# Patient Record
Sex: MALE | Race: WHITE | NOT HISPANIC OR LATINO | URBAN - METROPOLITAN AREA
[De-identification: names, ages, dates, MRNs, and addresses within clinical notes are randomized per-mention and may not be internally consistent; named-entity substitution may affect disease eponyms.]

---

## 2017-12-24 ENCOUNTER — EMERGENCY (EMERGENCY)
Facility: HOSPITAL | Age: 82
LOS: 1 days | Discharge: HOME | End: 2017-12-24
Attending: EMERGENCY MEDICINE | Admitting: EMERGENCY MEDICINE
Payer: MEDICARE

## 2017-12-24 VITALS
RESPIRATION RATE: 18 BRPM | HEART RATE: 58 BPM | OXYGEN SATURATION: 96 % | SYSTOLIC BLOOD PRESSURE: 132 MMHG | DIASTOLIC BLOOD PRESSURE: 59 MMHG

## 2017-12-24 VITALS
SYSTOLIC BLOOD PRESSURE: 238 MMHG | DIASTOLIC BLOOD PRESSURE: 91 MMHG | TEMPERATURE: 98 F | OXYGEN SATURATION: 97 % | HEART RATE: 61 BPM | RESPIRATION RATE: 18 BRPM

## 2017-12-24 DIAGNOSIS — S01.111A LACERATION WITHOUT FOREIGN BODY OF RIGHT EYELID AND PERIOCULAR AREA, INITIAL ENCOUNTER: ICD-10-CM
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
## 2017-12-24 DIAGNOSIS — I10 ESSENTIAL (PRIMARY) HYPERTENSION: ICD-10-CM
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
## 2017-12-24 DIAGNOSIS — E78.5 HYPERLIPIDEMIA, UNSPECIFIED: ICD-10-CM

## 2017-12-24 DIAGNOSIS — I25.10 ATHEROSCLEROTIC HEART DISEASE OF NATIVE CORONARY ARTERY WITHOUT ANGINA PECTORIS: ICD-10-CM
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
## 2017-12-24 LAB
ALBUMIN SERPL ELPH-MCNC: 4.1 G/DL — SIGNIFICANT CHANGE UP (ref 3.3–5)
ALP SERPL-CCNC: 55 U/L — SIGNIFICANT CHANGE UP (ref 40–120)
ALT FLD-CCNC: 13 U/L — SIGNIFICANT CHANGE UP (ref 10–45)
ANION GAP SERPL CALC-SCNC: 9 MMOL/L — SIGNIFICANT CHANGE UP (ref 5–17)
APPEARANCE UR: CLEAR — SIGNIFICANT CHANGE UP
APTT BLD: 28.4 SEC — SIGNIFICANT CHANGE UP (ref 27.5–37.4)
AST SERPL-CCNC: 21 U/L — SIGNIFICANT CHANGE UP (ref 10–40)
BACTERIA # UR AUTO: PRESENT /HPF
BASOPHILS NFR BLD AUTO: 0.4 % — SIGNIFICANT CHANGE UP (ref 0–2)
BILIRUB SERPL-MCNC: 0.6 MG/DL — SIGNIFICANT CHANGE UP (ref 0.2–1.2)
BILIRUB UR-MCNC: NEGATIVE — SIGNIFICANT CHANGE UP
BUN SERPL-MCNC: 21 MG/DL — SIGNIFICANT CHANGE UP (ref 7–23)
CALCIUM SERPL-MCNC: 9.6 MG/DL — SIGNIFICANT CHANGE UP (ref 8.4–10.5)
CHLORIDE SERPL-SCNC: 102 MMOL/L — SIGNIFICANT CHANGE UP (ref 96–108)
CO2 SERPL-SCNC: 30 MMOL/L — SIGNIFICANT CHANGE UP (ref 22–31)
COLOR SPEC: YELLOW — SIGNIFICANT CHANGE UP
CREAT SERPL-MCNC: 1.03 MG/DL — SIGNIFICANT CHANGE UP (ref 0.5–1.3)
DIFF PNL FLD: (no result)
EOSINOPHIL NFR BLD AUTO: 2.7 % — SIGNIFICANT CHANGE UP (ref 0–6)
EPI CELLS # UR: SIGNIFICANT CHANGE UP /HPF (ref 0–5)
GLUCOSE SERPL-MCNC: 140 MG/DL — HIGH (ref 70–99)
GLUCOSE UR QL: 100
HCT VFR BLD CALC: 36.2 % — LOW (ref 39–50)
HGB BLD-MCNC: 12.9 G/DL — LOW (ref 13–17)
INR BLD: 1.19 — HIGH (ref 0.88–1.16)
KETONES UR-MCNC: 15 MG/DL
LEUKOCYTE ESTERASE UR-ACNC: NEGATIVE — SIGNIFICANT CHANGE UP
LYMPHOCYTES # BLD AUTO: 26.5 % — SIGNIFICANT CHANGE UP (ref 13–44)
MCHC RBC-ENTMCNC: 32.1 PG — SIGNIFICANT CHANGE UP (ref 27–34)
MCHC RBC-ENTMCNC: 35.6 G/DL — SIGNIFICANT CHANGE UP (ref 32–36)
MCV RBC AUTO: 90 FL — SIGNIFICANT CHANGE UP (ref 80–100)
MONOCYTES NFR BLD AUTO: 12.8 % — SIGNIFICANT CHANGE UP (ref 2–14)
NEUTROPHILS NFR BLD AUTO: 57.6 % — SIGNIFICANT CHANGE UP (ref 43–77)
NITRITE UR-MCNC: NEGATIVE — SIGNIFICANT CHANGE UP
PH UR: 7.5 — SIGNIFICANT CHANGE UP (ref 5–8)
PLATELET # BLD AUTO: 84 K/UL — LOW (ref 150–400)
POTASSIUM SERPL-MCNC: 4.3 MMOL/L — SIGNIFICANT CHANGE UP (ref 3.5–5.3)
POTASSIUM SERPL-SCNC: 4.3 MMOL/L — SIGNIFICANT CHANGE UP (ref 3.5–5.3)
PROT SERPL-MCNC: 7 G/DL — SIGNIFICANT CHANGE UP (ref 6–8.3)
PROT UR-MCNC: NEGATIVE MG/DL — SIGNIFICANT CHANGE UP
PROTHROM AB SERPL-ACNC: 13.2 SEC — HIGH (ref 9.8–12.7)
RBC # BLD: 4.02 M/UL — LOW (ref 4.2–5.8)
RBC # FLD: 12.9 % — SIGNIFICANT CHANGE UP (ref 10.3–16.9)
RBC CASTS # UR COMP ASSIST: (no result) /HPF
SODIUM SERPL-SCNC: 141 MMOL/L — SIGNIFICANT CHANGE UP (ref 135–145)
SP GR SPEC: 1.01 — SIGNIFICANT CHANGE UP (ref 1–1.03)
UROBILINOGEN FLD QL: 0.2 E.U./DL — SIGNIFICANT CHANGE UP
WBC # BLD: 5.2 K/UL — SIGNIFICANT CHANGE UP (ref 3.8–10.5)
WBC # FLD AUTO: 5.2 K/UL — SIGNIFICANT CHANGE UP (ref 3.8–10.5)
WBC UR QL: < 5 /HPF — SIGNIFICANT CHANGE UP

## 2017-12-24 PROCEDURE — 82553 CREATINE MB FRACTION: CPT

## 2017-12-24 PROCEDURE — 96374 THER/PROPH/DIAG INJ IV PUSH: CPT | Mod: XU

## 2017-12-24 PROCEDURE — 70450 CT HEAD/BRAIN W/O DYE: CPT

## 2017-12-24 PROCEDURE — 80053 COMPREHEN METABOLIC PANEL: CPT

## 2017-12-24 PROCEDURE — 71010: CPT | Mod: 26

## 2017-12-24 PROCEDURE — 72125 CT NECK SPINE W/O DYE: CPT | Mod: 26

## 2017-12-24 PROCEDURE — 12013 RPR F/E/E/N/L/M 2.6-5.0 CM: CPT

## 2017-12-24 PROCEDURE — 82550 ASSAY OF CK (CPK): CPT

## 2017-12-24 PROCEDURE — 70486 CT MAXILLOFACIAL W/O DYE: CPT

## 2017-12-24 PROCEDURE — 93005 ELECTROCARDIOGRAM TRACING: CPT | Mod: XU

## 2017-12-24 PROCEDURE — 85025 COMPLETE CBC W/AUTO DIFF WBC: CPT

## 2017-12-24 PROCEDURE — 87086 URINE CULTURE/COLONY COUNT: CPT

## 2017-12-24 PROCEDURE — 36415 COLL VENOUS BLD VENIPUNCTURE: CPT

## 2017-12-24 PROCEDURE — 96376 TX/PRO/DX INJ SAME DRUG ADON: CPT | Mod: XU

## 2017-12-24 PROCEDURE — 81001 URINALYSIS AUTO W/SCOPE: CPT

## 2017-12-24 PROCEDURE — 99284 EMERGENCY DEPT VISIT MOD MDM: CPT | Mod: 25

## 2017-12-24 PROCEDURE — 70486 CT MAXILLOFACIAL W/O DYE: CPT | Mod: 26

## 2017-12-24 PROCEDURE — 93010 ELECTROCARDIOGRAM REPORT: CPT | Mod: 59

## 2017-12-24 PROCEDURE — 71045 X-RAY EXAM CHEST 1 VIEW: CPT

## 2017-12-24 PROCEDURE — 70450 CT HEAD/BRAIN W/O DYE: CPT | Mod: 26,76

## 2017-12-24 PROCEDURE — 84484 ASSAY OF TROPONIN QUANT: CPT

## 2017-12-24 PROCEDURE — 99285 EMERGENCY DEPT VISIT HI MDM: CPT | Mod: 25

## 2017-12-24 PROCEDURE — 85730 THROMBOPLASTIN TIME PARTIAL: CPT

## 2017-12-24 PROCEDURE — 72125 CT NECK SPINE W/O DYE: CPT

## 2017-12-24 PROCEDURE — 85610 PROTHROMBIN TIME: CPT

## 2017-12-24 RX ORDER — SODIUM CHLORIDE 9 MG/ML
1000 INJECTION INTRAMUSCULAR; INTRAVENOUS; SUBCUTANEOUS ONCE
Qty: 0 | Refills: 0 | Status: COMPLETED | OUTPATIENT
Start: 2017-12-24 | End: 2017-12-24

## 2017-12-24 RX ORDER — HYDRALAZINE HCL 50 MG
10 TABLET ORAL ONCE
Qty: 0 | Refills: 0 | Status: COMPLETED | OUTPATIENT
Start: 2017-12-24 | End: 2017-12-24

## 2017-12-24 RX ORDER — HYDRALAZINE HCL 50 MG
25 TABLET ORAL ONCE
Qty: 0 | Refills: 0 | Status: COMPLETED | OUTPATIENT
Start: 2017-12-24 | End: 2017-12-24

## 2017-12-24 RX ORDER — ACETAMINOPHEN 500 MG
650 TABLET ORAL ONCE
Qty: 0 | Refills: 0 | Status: COMPLETED | OUTPATIENT
Start: 2017-12-24 | End: 2017-12-24

## 2017-12-24 RX ADMIN — Medication 650 MILLIGRAM(S): at 06:04

## 2017-12-24 RX ADMIN — Medication 650 MILLIGRAM(S): at 07:40

## 2017-12-24 RX ADMIN — Medication 10 MILLIGRAM(S): at 06:19

## 2017-12-24 RX ADMIN — Medication 25 MILLIGRAM(S): at 07:52

## 2017-12-24 RX ADMIN — SODIUM CHLORIDE 2000 MILLILITER(S): 9 INJECTION INTRAMUSCULAR; INTRAVENOUS; SUBCUTANEOUS at 10:13

## 2017-12-24 RX ADMIN — Medication 10 MILLIGRAM(S): at 08:35

## 2017-12-24 NOTE — ED ADULT NURSE REASSESSMENT NOTE - NS ED NURSE REASSESS COMMENT FT1
Received patient from previous shift RN awake, alert and oriented x4, denies discomfort, and remains in stable condition.  Continue to monitor.  Pending repeat Head CT scan.

## 2017-12-24 NOTE — ED ADULT TRIAGE NOTE - CHIEF COMPLAINT QUOTE
Per EMS " Pt with hx of dementia fell out of bed this morning and received a laceration and contusion to right upper forehead.  No LOC.  Pt takes aspirin"

## 2017-12-24 NOTE — CONSULT NOTE ADULT - ASSESSMENT
89 year old male with HTN, HLD, CAD, Dementia on ASA 81mg s/p fall from bed with right scalp laceration and periorbital hematoma, CT Head with suspicion for cerebellar contusion in the region of the vermis.    -Case and films reviewed w/ Dr. Hayes,  -No neurosurgical intervention is anticipated,  -Agree with repeat CT Head in 6 hours for follow-up,  -Recommend BP control with goal systolic <150,  -Hold ASA at this time, no need for active reversal,  -Would consider PT eval and S/W for disposition given patient's age, comorbidities and current clinical condition.  -Will continue to follow.

## 2017-12-24 NOTE — ED ADULT NURSE REASSESSMENT NOTE - NS ED NURSE REASSESS COMMENT FT1
Pt became hypotensive, Zach was present at bedside. 1L NS initiated. Pt A/Ox3, speaking in clear full sentences. Pt has no complaints. Awaiting follow up CT. WIll continue to monitor and maintain safety.

## 2017-12-24 NOTE — CONSULT NOTE ADULT - SUBJECTIVE AND OBJECTIVE BOX
HISTORY OF PRESENT ILLNESS: 89 year old male ARLEEN following fall from bed overnight with trauma to right forehead and eye with profuse bleeding as per wife at bedside. His wife reports hearing the patient fall without any LOC, seizures or other mental status changes. She was unable to stop the bleeding from the scalp and called EMS. CT Head, Maxillofacial and Cervical spine was performed in ED. CT Head is suspicious for some contusional injury in the cerebellum, scans are otherwise without acute injuries. Patient reports some pain over right eye otherwise without complaints of headache, visual or hearing changes, extremity weakness or numbness. Denies chest pain, difficulty breathing. He takes Aspirin 81mg but otherwise denies any anticoagulation use. His wife has a partial medication list at bedside.    PMH: HTN,  HLD,  CAD,  Alzheimer's Dementia    FAMILY HISTORY: non-contributory      SOCIAL HISTORY:  Tobacco Use: Denies  EtOH use: Daily wine 1-2 glasses  Substance: Denies    Allergies: NKDA          REVIEW OF SYSTEMS  Non-contributory, see HPI          Vital Signs Last 24 Hrs  T(C): 36.6 (24 Dec 2017 07:37), Max: 36.6 (24 Dec 2017 07:37)  T(F): 97.9 (24 Dec 2017 07:37), Max: 97.9 (24 Dec 2017 07:37)  HR: 65 (24 Dec 2017 07:37) (58 - 65)  BP: 178/78 (24 Dec 2017 07:37) (178/78 - 246/107)  BP(mean): --  RR: 19 (24 Dec 2017 07:37) (18 - 19)  SpO2: 97% (24 Dec 2017 07:37) (97% - 99%)    PHYSICAL EXAM:  Gen: NAD, AAOx2  HEENT: Left pupil brisk, EOMI. Right eye periorbital ecchymosis, pupil not visualized. +head dressing.  Neck: nontender, FROm  Neuro: CNs II-XII grossly intact. 5/5 str x4 extremities. Sensation to LT intact. Following commands. Finger-nose and LIYAH WNLs. Speech clear. GCS 15.    LABS:                        12.9   5.2   )-----------( 84       ( 24 Dec 2017 06:04 )             36.2     12-24    141  |  102  |  21  ----------------------------<  140<H>  4.3   |  30  |  1.03    Ca    9.6      24 Dec 2017 06:04    TPro  7.0  /  Alb  4.1  /  TBili  0.6  /  DBili  x   /  AST  21  /  ALT  13  /  AlkPhos  55  12-24    PT/INR - ( 24 Dec 2017 06:04 )   PT: 13.2 sec;   INR: 1.19          PTT - ( 24 Dec 2017 06:04 )  PTT:28.4 sec        RADIOLOGY & ADDITIONAL STUDIES  CT Head No Cont (12.24.17 @ 05:52) >  IMPRESSION:  1. Soft tissue swelling is seen over the right orbit.  2. Mild hyperdensity is seen in the cerebellar vermis and involving the   surface of the left cerebellar  hemisphere suspicious for contusion.  3. A focal 6 x 10 mm chronic lacunar infarct is seen in the left anterior   basal ganglia on axial image

## 2017-12-24 NOTE — ED PROVIDER NOTE - ENMT, MLM
Airway patent, Nasal mucosa clear. Mouth with normal mucosa. Throat has no vesicles, no oropharyngeal exudates and uvula is midline. no active epistaxis,

## 2017-12-24 NOTE — ED PROVIDER NOTE - OBJECTIVE STATEMENT
89 yo male with h/o Alzheimer's dementia, HTN, BIBA from home. Pt fell off his bed and has hematoma over his right forehead, has laceration above his right eyebrow that was bleeding a lot. Pt denies any pain to his b/l hips, pelvis, chest or abdomen, pt denies any injury or pain to his upper and lower extremities. Wife mentioned that they had a few glasses of vine with dinner last night, and pt was feeling fine. 89 yo male with h/o Alzheimer's dementia, HTN, BIBA from home. Pt fell off his bed and has hematoma over his right forehead, has laceration above his right eyebrow that was bleeding a lot. Pt denies any pain to his b/l hips, pelvis, chest or abdomen, pt denies any injury or pain to his upper and lower extremities. Wife mentioned that they had a few glasses of wine with dinner last night, and pt was feeling fine.

## 2017-12-24 NOTE — ED PROVIDER NOTE - ATTENDING CONTRIBUTION TO CARE
89 year old male with HTN, HLD, CAD, Dementia on ASA 81mg s/p fall from bed, here in Ed with right scalp laceration (repaired by PA) and periorbital hematoma. Otherwise well appearing with no neurological complaints, motor/sensory intact, speech and gait as per baseline. CT Head with suspicion for cerebellar contusion. Seen by Nsurg, and goc for minimal interventions as per wife. DC home in NAD with strict return precautions given to pt and wife.

## 2017-12-24 NOTE — ED ADULT NURSE NOTE - CHPI ED SYMPTOMS NEG
no fever/no numbness/no loss of consciousness/no abrasion/no weakness/no vomiting/no deformity/no tingling/no confusion

## 2017-12-24 NOTE — ED PROVIDER NOTE - MEDICAL DECISION MAKING DETAILS
89 yo male with h/o HTN, dementia, s/p fall at home tonight. Pt with right periorbital hematoma. head CT scan findings consistent with left cerebellar contusion. Neurosurgery called for consult. pending labs and neurosurgery eval. most likely admission. 89 yo male with h/o HTN, dementia, s/p fall at home tonight. Pt with right periorbital hematoma. head CT scan findings consistent with left cerebellar contusion. Neurosurgery called for consult. pending labs and neurosurgery eval. most likely admission.    Repeat CT negative. Consulted with Neurosurgery. Pt can be dc'ed and as per pt and wife, pt wants to be dc'ed home. Pt's laceration was repaired. Pt advised to return in 5-7 days for suture removal.

## 2017-12-25 LAB
CULTURE RESULTS: SIGNIFICANT CHANGE UP
SPECIMEN SOURCE: SIGNIFICANT CHANGE UP

## 2021-01-23 NOTE — ED ADULT NURSE NOTE - CADM POA URETHRAL CATHETER
Pt rested well today. No signs of distress. VSS. Tolerating diet well. Pt now resting quietly in bed. Bed low and locked with call bell within reach. No

## 2022-09-13 NOTE — ED ADULT NURSE NOTE - CADM POA CENTRAL LINE
Pt complaining of left flank pain radiating to groin x 1 days. also complaining of nausea and vomiting No